# Patient Record
(demographics unavailable — no encounter records)

---

## 2024-10-15 NOTE — PHYSICAL EXAM
[Chaperone Present] : A chaperone was present in the examining room during all aspects of the physical examination [38650] : A chaperone was present during the pelvic exam. [Fully active, able to carry on all pre-disease performance without restriction] : Status 0 - Fully active, able to carry on all pre-disease performance without restriction

## 2024-10-15 NOTE — PHYSICAL EXAM
[Chaperone Present] : A chaperone was present in the examining room during all aspects of the physical examination [[id number]] : A chaperone was present during the pelvic exam. [Fully active, able to carry on all pre-disease performance without restriction] : Status 0 - Fully active, able to carry on all pre-disease performance without restriction

## 2024-10-18 NOTE — DISCUSSION/SUMMARY
[Reviewed Clinical Lab Test(s)] : Results of clinical tests were reviewed. [Reviewed Radiology Report(s)] : Radiology reports were reviewed. [Reviewed Radiology Film/Image(s)] : Images from radiology studies were reviewed and examined. [Discuss Alternatives/Risks/Benefits w/Patient] : All alternatives, risks, and benefits were discussed with the patient/family and all questions were answered.  Patient expressed good understanding and appreciates the importance of follow up as recommended. [Visit Time ___ Minutes] : [unfilled] minutes [Face to Face Time___ Minutes] : with [unfilled] minutes in face to face consultation. [FreeTextEntry1] : 52yo P2 w/ AUB- CAH/EIN, for definitive surgical  mgmt. Also with RASHEEDA / urge incontinence, for further evaluation. -more than 50% of visit spent face to face with patient reviewing records and interpreting imaging/path/lab results, counseling and coordinating care -I reviewed the diagnosis of CAH and natural history of this disease. I reviewed that it is a pre-cancer with 25% risk for progression to cancer if left untreated and with 40% risk of low grade endometrial cancer being found at time of final path on hysterectomy specimen. I reviewed medical vs surgical mgmt of this condition and that it is hormone driven / estrogen dependent. I explained that medical mgmt with progesterone therapy to counteract the excess estrogen is often reserved for patients who desire future fertility or are poor surgical candidates. Patient desires definitive surgery.  -Different surgical approaches discussed including minimally invasive and open approaches. I recommend advanced laparoscopic robotic assisted total hysterectomy and bilateral salpingo-oophorectomy with sentinel lymph node dissection. I explained to patient that since there is a 40% chance of finding concurrent endometrial cancer at time of final pathology, I follow The NCCN guidelines with regards to endometrial cancer staging and reviewed sentinel lymph node mapping and biopsies. If sentinel lymph node biopsy is unsuccessful, I explained that I would have pathologist perform a frozen section to determine if cancer is present. If cancer is present then full lymph node dissection would be performed. If no cancer found then I would not proceed with full lymph node dissection given the morbidity associated. all questions answered and patient expressed understanding. -pt with RASHEEDA/ urge incontinence/ overflow incontinence, will refer to pelvic floor PT and urogyn -pt to have her scheduled EGD/ colonoscopy 10/28/24 for GI sxs -Surgical booking: robo tlh/bso/slnbx/possible laparotomy -ERAS, pre-op clearance at [facility], labs, covid testing as pre protocol -the above diagnosis, nature of treatment, procedure, options, benefits, and risks were reviewed. I explained in detail the possible complications including but not limited to bleeding, transfusion, transfusion complications, infection, injury to internal organs such as injury to gastrointestinal or urinary tract, possible fistula formation, prolonged catherization, intestinal or urinary tract obstruction, vascular injury, wound complications, venous thrombosis, pulmonary embolus, sepsis, pain, chronic disability, and fatal complications, possible re-operation to correct complications, and possible abandoning or modifying the procedure due to inoperative findings was discussed. All questions were answered. Patient verbally indicated that she understood the nature of treatment, indications, options, benefits, and risks. Patient elected and consented to the procedure. Pt was informed that there was no guarantee for outcome or cure, and that additional therapy may be indicated depending on the operative findings.  -f/u post-op.

## 2024-10-18 NOTE — DISCUSSION/SUMMARY
[Reviewed Clinical Lab Test(s)] : Results of clinical tests were reviewed. [Reviewed Radiology Report(s)] : Radiology reports were reviewed. [Reviewed Radiology Film/Image(s)] : Images from radiology studies were reviewed and examined. [Discuss Alternatives/Risks/Benefits w/Patient] : All alternatives, risks, and benefits were discussed with the patient/family and all questions were answered.  Patient expressed good understanding and appreciates the importance of follow up as recommended. [Visit Time ___ Minutes] : [unfilled] minutes [Face to Face Time___ Minutes] : with [unfilled] minutes in face to face consultation. [FreeTextEntry1] : 52yo P2 w/ AUB- CAH/EIN, for definitive surgical  mgmt. Also with RASHEEDA / urge incontinence, for further evaluation. -more than 50% of visit spent face to face with patient reviewing records and interpreting imaging/path/lab results, counseling and coordinating care -I reviewed the diagnosis of CAH and natural history of this disease. I reviewed that it is a pre-cancer with 25% risk for progression to cancer if left untreated and with 40% risk of low grade endometrial cancer being found at time of final path on hysterectomy specimen. I reviewed medical vs surgical mgmt of this condition and that it is hormone driven / estrogen dependent. I explained that medical mgmt with progesterone therapy to counteract the excess estrogen is often reserved for patients who desire future fertility or are poor surgical candidates. Patient desires definitive surgery.  -Different surgical approaches discussed including minimally invasive and open approaches. I recommend advanced laparoscopic robotic assisted total hysterectomy and bilateral salpingo-oophorectomy with sentinel lymph node dissection. I explained to patient that since there is a 40% chance of finding concurrent endometrial cancer at time of final pathology, I follow The NCCN guidelines with regards to endometrial cancer staging and reviewed sentinel lymph node mapping and biopsies. If sentinel lymph node biopsy is unsuccessful, I explained that I would have pathologist perform a frozen section to determine if cancer is present. If cancer is present then full lymph node dissection would be performed. If no cancer found then I would not proceed with full lymph node dissection given the morbidity associated. all questions answered and patient expressed understanding. -pt with RASHEEDA/ urge incontinence/ overflow incontinence, will refer to pelvic floor PT and urogyn -pt to have her scheduled EGD/ colonoscopy 10/28/24 for GI sxs -Surgical booking: robo tlh/bso/slnbx/possible laparotomy -ERAS, pre-op clearance at [facility], labs, covid testing as pre protocol -the above diagnosis, nature of treatment, procedure, options, benefits, and risks were reviewed. I explained in detail the possible complications including but not limited to bleeding, transfusion, transfusion complications, infection, injury to internal organs such as injury to gastrointestinal or urinary tract, possible fistula formation, prolonged catherization, intestinal or urinary tract obstruction, vascular injury, wound complications, venous thrombosis, pulmonary embolus, sepsis, pain, chronic disability, and fatal complications, possible re-operation to correct complications, and possible abandoning or modifying the procedure due to inoperative findings was discussed. All questions were answered. Patient verbally indicated that she understood the nature of treatment, indications, options, benefits, and risks. Patient elected and consented to the procedure. Pt was informed that there was no guarantee for outcome or cure, and that additional therapy may be indicated depending on the operative findings.  -f/u post-op.

## 2024-10-18 NOTE — HISTORY OF PRESENT ILLNESS
[FreeTextEntry1] : 54yo P2  LMP  referred for AUB and finding of thickened EE. Pt was taken for hysteroscopy D&C and found to have atypical hyperplasia(see path below). She reports irregular and heavy bleeding over the last 2yrs causing anemia. She has had multiple D&C's which showed simple hyperplasia until most recent procedure and has been treated with Provera to thin lining in the past. She notes she has had worsening mixed urinary incontinence over the last two years and some increased bloating/gas. denies n/v/fever/bleeding/bloating. Reports normal urination and BMs. She is currently on iron supplementation and has intermittent constipation.   PMHx: anxiety PSHx: breast, nose surgery OBGYNHx:  x2, denies hx of fibroids/abn paps/stis, hx of ovarian cyst in the past FamHx: maternal grandmother with breast cancer, maternal grandfather lung cancer - smoker, uncle colon ca, uncle lung cancer - nonsmoker, denies gyn ca SocHx: social ETOH, denies smoking/illicit drugs All: Sulfa - rash   mammogram: up to date and wnl colonoscopy: done 3yrs ago and WNL, scheduled for 10/28/24 for EGD/colonoscopy due to reflux and bloating   Labs: 24 Pathology: A.  Endometrial polyp and endometrial curettings: Atypical hyperplasia with tubal metaplasia involving polypoid and nonpolypoid endometrial fragments in a background of proliferative endometrium.  Unremarkable squamous epithelium.  Unremarkable endocervical tissue, see comment. Comment: Immunohistochemistry shows the atypical glands have loss of PAX2 and PTEN, supporting the above diagnosis.  E-cadherin shows normal membranous staining in ARID1A is retained.  22: Pap: NILM, HPV neg   Imaging: MRI pelvis 2024: Findings: The uterus is anteverted and measures 8.5 x 5.8 x 6.6 cm.  The endometrium is mildly heterogeneous in signal, no discrete endometrial mass is evident.  Endometrium measures up to 1.1 cm in the uterine body.  Nabothian cysts are noted in the cervix. The junctional zone is not thickened.  The junctional zone anteriorly measures 0.8 cm and posteriorly 0.9 cm. An intramural leiomyoma in the right aspect of the uterine body measures 0.6 cm.  No additionally leiomyoma. A simple cyst or follicle in the right ovary measures 2.7 x 2.0 cm.  The right ovary measures 3.7 x 2.6 cm. A simple cyst or follicle in the left ovary measures 2.0 x 1.5 cm.  The left ovary measures 2.6 x 2.2 cm. A small amount of intrapelvic free fluid is likely physiologic. Imaged portions of the small bowel and colon are normal in course and caliber without focal wall thickening.  The urinary bladder is incompletely distended.  No bone marrow signal abnormality.   Impression: 1.  Mildly heterogeneous endometrium. 2.  No evidence of discrete endometrial mass, adenomyosis, or submucosal leiomyoma. 3.  Simple bilateral ovarian follicle/cyst, almost certainly benign   Pelvic sonogram 2024: Findings: Uterus: The uterus is anteverted, measuring 8.7 x 5.1 x 5.0 cm.  No discrete fibroids. Endometrium: Mildly prominent, 0.8 cm maximum thickness Right ovary: Measures 2.3 x 1.4 x 1.9 cm.  Right ovary has normal morphology Left ovary: Measures 1.2 x 2.1 x 1.5 cm.  No adnexal mass Free fluid: None Impression: 1.  No adnexal mass or free fluid. 2.  Mild prominence of the endometrium which may be related to the phase of the patient's cycle.  Consider follow-up 1-3 days after cessation of scheduled menses.

## 2024-12-24 NOTE — HISTORY OF PRESENT ILLNESS
[FreeTextEntry1] : 54yo P2 w/ AUB- CAH/EIN, and with RASHEEDA / urge incontinence. Pt s/p robo tlh/bso/slnbx, robotic uterosacral ligament suspension on 12/12/24. Surgical course c/b anaphylactic reaction to fluorocine administered IV during urogyn portion of case. Pt made full recovery after ICU admission.  Since procedure pt reports doing well. she had some leg pain and had RLE doppler to rule out DVT and it was negative. she denies pain/fever/bleeding/bloating. She has normal activity tolerance and normal appetite. Reports normal urination and BMs. She has been seen by urogyn for post-op visit and will continue f/u to monitor urinary sxs. She does admit to feeling very weak since admission but getting stronger everyday. she reports worsening menopause sxs with insomnia and night sweats/hot flushes. pt interested in HRT. Reviewed path and all benign. no further intervention.    Path 12/12/24: FINAL PATHOLOGIC DIAGNOSIS  A. ANTERIOR ABDOMINAL WALL IMPLANT, EXCISION - Fibroadipose tissue without significant pathologic abnormality - A stain for cytokeratin AE1/3 supports this diagnosis - Deeper levels were examined  B.  LEFT PELVIC SENTINEL LYMPH NODE, EXCISION - One histologically unremarkable lymph node - A stain for cytokeratin AE1/3 supports this diagnosis  C.  RIGHT PELVIC SENTINEL LYMPH NODE, EXCISION - One histologically unremarkable lymph node - A stain for cytokeratin AE1/3 supports this diagnosis  D.  UTERUS, CERVIX, BILATERAL FALLOPIAN TUBES AND BILATERAL OVARIES, HYSTERECTOMY AND BILATERAL SALPINGO-OOPHORECTOMY - Ectocervix with no significant pathologic abnormality - Endocervix with no significant pathologic abnormality - Uterus with benign proliferative endometrium with focal glandular crowding and focal metaplastic epithelial changes - Leiomyomata - Bilateral fimbriated fallopian tubes with no significant pathologic abnormalities - Bilateral ovaries with no significant pathologic abnormalities - Stains for p16, p53, cytokeratin 7, pax8, and CDX2, cytokeratin AE1/3, and Ki67 support these diagnoses - Deeper levels of select blocks were examined  Ed Jamison: 9/12/24 Pathology: A.  Endometrial polyp and endometrial curettings: Atypical hyperplasia with tubal metaplasia involving polypoid and nonpolypoid endometrial fragments in a background of proliferative endometrium.  Unremarkable squamous epithelium.  Unremarkable endocervical tissue, see comment. Comment: Immunohistochemistry shows the atypical glands have loss of PAX2 and PTEN, supporting the above diagnosis.  E-cadherin shows normal membranous staining in ARID1A is retained.  12/20/22: Pap: NILM, HPV neg   Imaging: MRI pelvis 7/25/2024: Findings: The uterus is anteverted and measures 8.5 x 5.8 x 6.6 cm.  The endometrium is mildly heterogeneous in signal, no discrete endometrial mass is evident.  Endometrium measures up to 1.1 cm in the uterine body.  Nabothian cysts are noted in the cervix. The junctional zone is not thickened.  The junctional zone anteriorly measures 0.8 cm and posteriorly 0.9 cm. An intramural leiomyoma in the right aspect of the uterine body measures 0.6 cm.  No additionally leiomyoma. A simple cyst or follicle in the right ovary measures 2.7 x 2.0 cm.  The right ovary measures 3.7 x 2.6 cm. A simple cyst or follicle in the left ovary measures 2.0 x 1.5 cm.  The left ovary measures 2.6 x 2.2 cm. A small amount of intrapelvic free fluid is likely physiologic. Imaged portions of the small bowel and colon are normal in course and caliber without focal wall thickening.  The urinary bladder is incompletely distended.  No bone marrow signal abnormality.   Impression: 1.  Mildly heterogeneous endometrium. 2.  No evidence of discrete endometrial mass, adenomyosis, or submucosal leiomyoma. 3.  Simple bilateral ovarian follicle/cyst, almost certainly benign   Pelvic sonogram 5/21/2024: Findings: Uterus: The uterus is anteverted, measuring 8.7 x 5.1 x 5.0 cm.  No discrete fibroids. Endometrium: Mildly prominent, 0.8 cm maximum thickness Right ovary: Measures 2.3 x 1.4 x 1.9 cm.  Right ovary has normal morphology Left ovary: Measures 1.2 x 2.1 x 1.5 cm.  No adnexal mass Free fluid: None Impression: 1.  No adnexal mass or free fluid. 2.  Mild prominence of the endometrium which may be related to the phase of the patient's cycle.  Consider follow-up 1-3 days after cessation of scheduled menses.

## 2024-12-24 NOTE — DISCUSSION/SUMMARY
[Reviewed Clinical Lab Test(s)] : Results of clinical tests were reviewed. [Reviewed Radiology Report(s)] : Radiology reports were reviewed. [Discuss Alternatives/Risks/Benefits w/Patient] : All alternatives, risks, and benefits were discussed with the patient/family and all questions were answered.  Patient expressed good understanding and appreciates the importance of follow up as recommended. [Visit Time ___ Minutes] : [unfilled] minutes [Face to Face Time___ Minutes] : with [unfilled] minutes in face to face consultation. [FreeTextEntry1] : 54yo P2 w/ AUB- CAH/EIN, and with RASHEEDA / urge incontinence. Pt s/p robo tlh/bso/slnbx, robotic uterosacral ligament suspension on 12/12/24. Surgical course c/b anaphylactic reaction to fluorocine administered IV during urogyn portion of case. Pt made full recovery after ICU admission. Exam today wnl, healing well. Path benign, no further intervention. Pt with surgical menopause sxs, for HRT with estrogen patch. -more than 50% of visit spent face to face with patient counseling and coordinating care -I reviewed final path and benign findings. reassured pt that CAH/EIN was confined to polyp, no residual disease on final hyst specimen. no furhter intervention needed -reviewed menopause sxs and will start estrogen patch and pt to transition care to GYN for further menopause mgmt -continue light activity, no heavy lifting, continue pelvic rest -rtc 4wks for cuff check -f/u with urogyn as scheduled -pain/fever/bleeding precautions given

## 2025-01-24 NOTE — HISTORY OF PRESENT ILLNESS
[FreeTextEntry1] : 53yo P2 w/ AUB- CAH/EIN, and with RASHEEDA / urge incontinence. Pt s/p robo tlh/bso/slnbx, robotic uterosacral ligament suspension on 12/12/24. Final path benign. Surgical course c/b anaphylactic reaction to fluorocine administered IV during urogyn portion of case. Pt made full recovery after ICU admission. Here today for cuff check  Since last visit patient reports doing very well. she is finally feeling like herself. she started estrogen patch for HRT and reports improved menopausal sxs. she denies pain/fever/bleeding/bloating. She has normal activity tolerance and normal appetite. Reports normal urination and BMs. she also is s/p abx for port site cellulitis which has now resolved.    Path 12/12/24: FINAL PATHOLOGIC DIAGNOSIS A. ANTERIOR ABDOMINAL WALL IMPLANT, EXCISION - Fibroadipose tissue without significant pathologic abnormality - A stain for cytokeratin AE1/3 supports this diagnosis - Deeper levels were examined B.  LEFT PELVIC SENTINEL LYMPH NODE, EXCISION - One histologically unremarkable lymph node - A stain for cytokeratin AE1/3 supports this diagnosis C.  RIGHT PELVIC SENTINEL LYMPH NODE, EXCISION - One histologically unremarkable lymph node - A stain for cytokeratin AE1/3 supports this diagnosis D.  UTERUS, CERVIX, BILATERAL FALLOPIAN TUBES AND BILATERAL OVARIES, HYSTERECTOMY AND BILATERAL SALPINGO-OOPHORECTOMY - Ectocervix with no significant pathologic abnormality - Endocervix with no significant pathologic abnormality - Uterus with benign proliferative endometrium with focal glandular crowding and focal metaplastic epithelial changes - Leiomyomata - Bilateral fimbriated fallopian tubes with no significant pathologic abnormalities - Bilateral ovaries with no significant pathologic abnormalities - Stains for p16, p53, cytokeratin 7, pax8, and CDX2, cytokeratin AE1/3, and Ki67 support these diagnoses - Deeper levels of select blocks were examined [facility]24 Pathology: A.  Endometrial polyp and endometrial curettings: Atypical hyperplasia with tubal metaplasia involving polypoid and nonpolypoid endometrial fragments in a background of proliferative endometrium.  Unremarkable squamous epithelium.  Unremarkable endocervical tissue, see comment. Comment: Immunohistochemistry shows the atypical glands have loss of PAX2 and PTEN, supporting the above diagnosis.  E-cadherin shows normal membranous staining in ARID1A is retained.  12/20/22: Pap: NILM, HPV neg   Imaging: MRI pelvis 7/25/2024: Findings: The uterus is anteverted and measures 8.5 x 5.8 x 6.6 cm.  The endometrium is mildly heterogeneous in signal, no discrete endometrial mass is evident.  Endometrium measures up to 1.1 cm in the uterine body.  Nabothian cysts are noted in the cervix. The junctional zone is not thickened.  The junctional zone anteriorly measures 0.8 cm and posteriorly 0.9 cm. An intramural leiomyoma in the right aspect of the uterine body measures 0.6 cm.  No additionally leiomyoma. A simple cyst or follicle in the right ovary measures 2.7 x 2.0 cm.  The right ovary measures 3.7 x 2.6 cm. A simple cyst or follicle in the left ovary measures 2.0 x 1.5 cm.  The left ovary measures 2.6 x 2.2 cm. A small amount of intrapelvic free fluid is likely physiologic. Imaged portions of the small bowel and colon are normal in course and caliber without focal wall thickening.  The urinary bladder is incompletely distended.  No bone marrow signal abnormality.   Impression: 1.  Mildly heterogeneous endometrium. 2.  No evidence of discrete endometrial mass, adenomyosis, or submucosal leiomyoma. 3.  Simple bilateral ovarian follicle/cyst, almost certainly benign   Pelvic sonogram 5/21/2024: Findings: Uterus: The uterus is anteverted, measuring 8.7 x 5.1 x 5.0 cm.  No discrete fibroids. Endometrium: Mildly prominent, 0.8 cm maximum thickness Right ovary: Measures 2.3 x 1.4 x 1.9 cm.  Right ovary has normal morphology Left ovary: Measures 1.2 x 2.1 x 1.5 cm.  No adnexal mass Free fluid: None Impression: 1.  No adnexal mass or free fluid. 2.  Mild prominence of the endometrium which may be related to the phase of the patient's cycle.  Consider follow-up 1-3 days after cessation of scheduled menses.

## 2025-01-24 NOTE — PHYSICAL EXAM
[Fully active, able to carry on all pre-disease performance without restriction] : Status 0 - Fully active, able to carry on all pre-disease performance without restriction [Chaperone Present] : A chaperone was present in the examining room during all aspects of the physical examination [70752] : A chaperone was present during the pelvic exam.

## 2025-01-24 NOTE — HISTORY OF PRESENT ILLNESS
[FreeTextEntry1] : 55yo P2 w/ AUB- CAH/EIN, and with RASHEEDA / urge incontinence. Pt s/p robo tlh/bso/slnbx, robotic uterosacral ligament suspension on 12/12/24. Final path benign. Surgical course c/b anaphylactic reaction to fluorocine administered IV during urogyn portion of case. Pt made full recovery after ICU admission. Here today for cuff check  Since last visit patient reports doing very well. she is finally feeling like herself. she started estrogen patch for HRT and reports improved menopausal sxs. she denies pain/fever/bleeding/bloating. She has normal activity tolerance and normal appetite. Reports normal urination and BMs. she also is s/p abx for port site cellulitis which has now resolved.    Path 12/12/24: FINAL PATHOLOGIC DIAGNOSIS A. ANTERIOR ABDOMINAL WALL IMPLANT, EXCISION - Fibroadipose tissue without significant pathologic abnormality - A stain for cytokeratin AE1/3 supports this diagnosis - Deeper levels were examined B.  LEFT PELVIC SENTINEL LYMPH NODE, EXCISION - One histologically unremarkable lymph node - A stain for cytokeratin AE1/3 supports this diagnosis C.  RIGHT PELVIC SENTINEL LYMPH NODE, EXCISION - One histologically unremarkable lymph node - A stain for cytokeratin AE1/3 supports this diagnosis D.  UTERUS, CERVIX, BILATERAL FALLOPIAN TUBES AND BILATERAL OVARIES, HYSTERECTOMY AND BILATERAL SALPINGO-OOPHORECTOMY - Ectocervix with no significant pathologic abnormality - Endocervix with no significant pathologic abnormality - Uterus with benign proliferative endometrium with focal glandular crowding and focal metaplastic epithelial changes - Leiomyomata - Bilateral fimbriated fallopian tubes with no significant pathologic abnormalities - Bilateral ovaries with no significant pathologic abnormalities - Stains for p16, p53, cytokeratin 7, pax8, and CDX2, cytokeratin AE1/3, and Ki67 support these diagnoses - Deeper levels of select blocks were examined [facility] Pathology: A.  Endometrial polyp and endometrial curettings: Atypical hyperplasia with tubal metaplasia involving polypoid and nonpolypoid endometrial fragments in a background of proliferative endometrium.  Unremarkable squamous epithelium.  Unremarkable endocervical tissue, see comment. Comment: Immunohistochemistry shows the atypical glands have loss of PAX2 and PTEN, supporting the above diagnosis.  E-cadherin shows normal membranous staining in ARID1A is retained.  12/20/22: Pap: NILM, HPV neg   Imaging: MRI pelvis 7/25/2024: Findings: The uterus is anteverted and measures 8.5 x 5.8 x 6.6 cm.  The endometrium is mildly heterogeneous in signal, no discrete endometrial mass is evident.  Endometrium measures up to 1.1 cm in the uterine body.  Nabothian cysts are noted in the cervix. The junctional zone is not thickened.  The junctional zone anteriorly measures 0.8 cm and posteriorly 0.9 cm. An intramural leiomyoma in the right aspect of the uterine body measures 0.6 cm.  No additionally leiomyoma. A simple cyst or follicle in the right ovary measures 2.7 x 2.0 cm.  The right ovary measures 3.7 x 2.6 cm. A simple cyst or follicle in the left ovary measures 2.0 x 1.5 cm.  The left ovary measures 2.6 x 2.2 cm. A small amount of intrapelvic free fluid is likely physiologic. Imaged portions of the small bowel and colon are normal in course and caliber without focal wall thickening.  The urinary bladder is incompletely distended.  No bone marrow signal abnormality.   Impression: 1.  Mildly heterogeneous endometrium. 2.  No evidence of discrete endometrial mass, adenomyosis, or submucosal leiomyoma. 3.  Simple bilateral ovarian follicle/cyst, almost certainly benign   Pelvic sonogram 5/21/2024: Findings: Uterus: The uterus is anteverted, measuring 8.7 x 5.1 x 5.0 cm.  No discrete fibroids. Endometrium: Mildly prominent, 0.8 cm maximum thickness Right ovary: Measures 2.3 x 1.4 x 1.9 cm.  Right ovary has normal morphology Left ovary: Measures 1.2 x 2.1 x 1.5 cm.  No adnexal mass Free fluid: None Impression: 1.  No adnexal mass or free fluid. 2.  Mild prominence of the endometrium which may be related to the phase of the patient's cycle.  Consider follow-up 1-3 days after cessation of scheduled menses.

## 2025-01-24 NOTE — PHYSICAL EXAM
[Fully active, able to carry on all pre-disease performance without restriction] : Status 0 - Fully active, able to carry on all pre-disease performance without restriction [Chaperone Present] : A chaperone was present in the examining room during all aspects of the physical examination [61643] : A chaperone was present during the pelvic exam.

## 2025-01-24 NOTE — DISCUSSION/SUMMARY
[Reviewed Clinical Lab Test(s)] : Results of clinical tests were reviewed. [Reviewed Radiology Report(s)] : Radiology reports were reviewed. [Discuss Alternatives/Risks/Benefits w/Patient] : All alternatives, risks, and benefits were discussed with the patient/family and all questions were answered.  Patient expressed good understanding and appreciates the importance of follow up as recommended. [Visit Time ___ Minutes] : [unfilled] minutes [Face to Face Time___ Minutes] : with [unfilled] minutes in face to face consultation. [FreeTextEntry1] : 55yo P2 w/ AUB- CAH/EIN, and with RASHEEDA / urge incontinence. Pt s/p robo tlh/bso/slnbx, robotic uterosacral ligament suspension on 12/12/24. Surgical course c/b anaphylactic reaction to fluorocine administered IV during urogyn portion of case. Pt made full recovery after ICU admission. Final path benign. Pt recovering well.  -more than 50% of visit spent face to face with patient counseling and coordinating care -I reviewed final path and benign findings. reassured pt that CAH/EIN was confined to polyp, no residual disease on final hyst specimen. no further intervention needed -pt to continue estrogen patch for HRT. will transition to general GYN for continued care -vaginal cuff well healed with solitary residual suture. pt advised slow return to full activity over next 2wks then she can resume all activities without limitations. refrain from intercourse for next 2wks -f/u with urogyn as scheduled -rtc 3 months and then likely d/c to general GYN -pain/fever/bleeding precautions given

## 2025-02-10 NOTE — REVIEW OF SYSTEMS
Exam and history consistent with viral gastroenteritis.  -Increase fluid intake to maintain hydration and to help fight infection.  -Alternate ibuprofen and tylenol every 3 hours as needed for fever/pain  -1 tablespoon of honey or mixed with hot tea for help with cough.  Recommend taking 30 minutes before sleep to help with cough at night  -Steam inhalation, warm compresses, humidifier, and warm soup can also help  -If he becomes unable to keep down any fluids, please call 911 and/or head to the emergency room immediately  -Please follow-up with your PCP in the next 2 to 4 weeks    If symptoms persist or worsen, please contact PCP and/or return to urgent care.    
[Negative] : Musculoskeletal

## 2025-05-27 NOTE — PHYSICAL EXAM
[RN] : RN [Fully active, able to carry on all pre-disease performance without restriction] : Status 0 - Fully active, able to carry on all pre-disease performance without restriction [FreeTextEntry2] : Iza Villa

## 2025-05-27 NOTE — HISTORY OF PRESENT ILLNESS
[FreeTextEntry1] : 55yo P2 w/ AUB- CAH/EIN, and with RASHEEDA / urge incontinence. Pt s/p robo tlh/bso/slnbx, robotic uterosacral ligament suspension on 12/12/24. Final path benign. Surgical course c/b anaphylactic reaction to fluorocine administered IV during urogyn portion of case. Pt made full recovery after ICU admission. Here today for pelvic discomfort following intercourse on Friday. Pt states she had pain immediately following which continued through the weekend and now with pelvic pressure. She palpated her own vaginal cuff and believes she feels a separation. She also notes that urinary symptoms have worsening in the last few weeks with spontaneous leaking. She continues on estrogen patch but just increased dose in the last few weeks, discussed waiting at least 4-6wks to determine if increased dose is effective. reports improved menopausal sxs but still experiencing occasional hot flashes. she denies fever/bleeding/bloating. She has normal activity tolerance and normal appetite. Reports normal baseline urination and BMs.     Path 12/12/24: FINAL PATHOLOGIC DIAGNOSIS A. ANTERIOR ABDOMINAL WALL IMPLANT, EXCISION - Fibroadipose tissue without significant pathologic abnormality - A stain for cytokeratin AE1/3 supports this diagnosis - Deeper levels were examined B.  LEFT PELVIC SENTINEL LYMPH NODE, EXCISION - One histologically unremarkable lymph node - A stain for cytokeratin AE1/3 supports this diagnosis C.  RIGHT PELVIC SENTINEL LYMPH NODE, EXCISION - One histologically unremarkable lymph node - A stain for cytokeratin AE1/3 supports this diagnosis D.  UTERUS, CERVIX, BILATERAL FALLOPIAN TUBES AND BILATERAL OVARIES, HYSTERECTOMY AND BILATERAL SALPINGO-OOPHORECTOMY - Ectocervix with no significant pathologic abnormality - Endocervix with no significant pathologic abnormality - Uterus with benign proliferative endometrium with focal glandular crowding and focal metaplastic epithelial changes - Leiomyomata - Bilateral fimbriated fallopian tubes with no significant pathologic abnormalities - Bilateral ovaries with no significant pathologic abnormalities - Stains for p16, p53, cytokeratin 7, pax8, and CDX2, cytokeratin AE1/3, and Ki67 support these diagnoses - Deeper levels of select blocks were examined [facility] Pathology: A.  Endometrial polyp and endometrial curettings: Atypical hyperplasia with tubal metaplasia involving polypoid and nonpolypoid endometrial fragments in a background of proliferative endometrium.  Unremarkable squamous epithelium.  Unremarkable endocervical tissue, see comment. Comment: Immunohistochemistry shows the atypical glands have loss of PAX2 and PTEN, supporting the above diagnosis.  E-cadherin shows normal membranous staining in ARID1A is retained.  12/20/22: Pap: NILM, HPV neg   Imaging: MRI pelvis 7/25/2024: Findings: The uterus is anteverted and measures 8.5 x 5.8 x 6.6 cm.  The endometrium is mildly heterogeneous in signal, no discrete endometrial mass is evident.  Endometrium measures up to 1.1 cm in the uterine body.  Nabothian cysts are noted in the cervix. The junctional zone is not thickened.  The junctional zone anteriorly measures 0.8 cm and posteriorly 0.9 cm. An intramural leiomyoma in the right aspect of the uterine body measures 0.6 cm.  No additionally leiomyoma. A simple cyst or follicle in the right ovary measures 2.7 x 2.0 cm.  The right ovary measures 3.7 x 2.6 cm. A simple cyst or follicle in the left ovary measures 2.0 x 1.5 cm.  The left ovary measures 2.6 x 2.2 cm. A small amount of intrapelvic free fluid is likely physiologic. Imaged portions of the small bowel and colon are normal in course and caliber without focal wall thickening.  The urinary bladder is incompletely distended.  No bone marrow signal abnormality.   Impression: 1.  Mildly heterogeneous endometrium. 2.  No evidence of discrete endometrial mass, adenomyosis, or submucosal leiomyoma. 3.  Simple bilateral ovarian follicle/cyst, almost certainly benign   Pelvic sonogram 5/21/2024: Findings: Uterus: The uterus is anteverted, measuring 8.7 x 5.1 x 5.0 cm.  No discrete fibroids. Endometrium: Mildly prominent, 0.8 cm maximum thickness Right ovary: Measures 2.3 x 1.4 x 1.9 cm.  Right ovary has normal morphology Left ovary: Measures 1.2 x 2.1 x 1.5 cm.  No adnexal mass Free fluid: None Impression: 1.  No adnexal mass or free fluid. 2.  Mild prominence of the endometrium which may be related to the phase of the patient's cycle.  Consider follow-up 1-3 days after cessation of scheduled menses.

## 2025-05-27 NOTE — DISCUSSION/SUMMARY
[Reviewed Clinical Lab Test(s)] : Results of clinical tests were reviewed. [Reviewed Radiology Report(s)] : Radiology reports were reviewed. [Discuss Alternatives/Risks/Benefits w/Patient] : All alternatives, risks, and benefits were discussed with the patient/family and all questions were answered.  Patient expressed good understanding and appreciates the importance of follow up as recommended. [Visit Time ___ Minutes] : [unfilled] minutes [Face to Face Time___ Minutes] : with [unfilled] minutes in face to face consultation. [FreeTextEntry1] : 53yo P2 w/ AUB- CAH/EIN, and with RASHEEDA / urge incontinence. Pt s/p robo tlh/bso/slnbx, robotic uterosacral ligament suspension on 12/12/24. Surgical course c/b anaphylactic reaction to fluorocine administered IV during urogyn portion of case. Pt made full recovery after ICU admission. Final path benign. Pt now presents with pain following intercourse and palpable vaginal cuff defect, found to have vaginal cuff mucosal separation.  -more than 50% of visit spent face to face with patient counseling and coordinating care -I reviewed exam findings c/w mucosal separation and no evidence of fistula. Discussed with Dr Vazquez who agrees low suspicion for fistula. Pt has follow up with uro-gyn later this week and will have further evaluation of urinary symptoms -strict pelvic rest, instructed nothing in vagina other than estrogen cream -f/u UA to r/o UTI -pt to continue estrogen patch for HRT. Will send rx for vaginal estrogen as well to aid in healing of mucosa -will likely require vaginal cuff repair and will book for OR following visit with Dr Vazquez -pain/fever/bleeding precautions given  d/w Dr Grover

## 2025-05-29 NOTE — DISCUSSION/SUMMARY
[Reviewed Clinical Lab Test(s)] : Results of clinical tests were reviewed. [Discuss Alternatives/Risks/Benefits w/Patient] : All alternatives, risks, and benefits were discussed with the patient/family and all questions were answered.  Patient expressed good understanding and appreciates the importance of follow up as recommended. [Visit Time ___ Minutes] : [unfilled] minutes [Face to Face Time___ Minutes] : with [unfilled] minutes in face to face consultation. [FreeTextEntry1] : 53yo P2 w/ AUB- CAH/EIN, and with RASHEEDA / urge incontinence. Pt s/p robo tlh/bso/slnbx, robotic uterosacral ligament suspension on 12/12/24. Surgical course c/b anaphylactic reaction to fluorocine administered IV during urogyn portion of case. Pt made full recovery after ICU admission. Final path benign. Pt now with delayed vaginal cuff mucosa separation. For continued vaginal estrogen therapy and for surgical repair. -entire televisit spent counseling patient and coordinating care. -pt with new urinary sxs, likely UTI which is being treated. Will see Dr. Vazquez post-op to re-eval urinary sxs and determine if other urogyn mgmt needed -Surgical booking: eua/ vaginal cuff repair  -pt will need clearance/ optimization by [facility] medicine/anesthesia/critical care teams -risk, benefits reviewed with patient regarding above described procedure.  Reviewed risks of procedure not limited to infection, bleeding, injury to surrounding structures,VTE, heart and lung complications. all questions answered and patient expressed understanding --f/u post-op -pain/fever/bleeding precautions given

## 2025-05-29 NOTE — REASON FOR VISIT
[FreeTextEntry1] : This telephonic visit was provided via audio only technology, location does not have video capabilities. The patient, MELODIE WEISS, was located at work, at the time of the visit.  The provider, ALEJANDRO PEREZ was located at office at [place], [place], at the time of the visit. The patient, QUANGMOOSE ABHISHEK and Provider participated in the telephonic visit.   Verbal consent for telephonic services was given on 05/29/2025 by the patient, MELODIE WEISS.

## 2025-05-29 NOTE — HISTORY OF PRESENT ILLNESS
[FreeTextEntry1] : 55yo P2 w/ AUB- CAH/EIN, and with RASHEEDA / urge incontinence. Pt s/p robo tlh/bso/slnbx, robotic uterosacral ligament suspension on 12/12/24. Final path benign. Surgical course c/b anaphylactic reaction to fluorocine administered IV during urogyn portion of case. Pt made full recovery after ICU admission.  Televisit today to review plan for surgical repair of vaginal cuff mucosa separation. Pt was seen by ANTONI Cabrera and diagnosis was made. Pt then saw Dr. Vazquez for eval and to ensure no concern for fistula since pt had some worsening urinary sxs. UA/Ucx done and prelim UA shows  presumed UTI and pt started on abx. Pt also started on vaginal estrogen however recommendation is for cuff repair to expedite healing process.  Pt without new complaints. She started abx for UTI and has stopped vitamins in preparation for surgery on 6/2/25. Pt to expect call from [facility] for final clearance and testing. I reviewed procedure with pt and plan for cuff repair with permanent sutures which will be removed at a later date in the office. all questions answered and patient expressed understanding   Path 12/12/24: FINAL PATHOLOGIC DIAGNOSIS A. ANTERIOR ABDOMINAL WALL IMPLANT, EXCISION - Fibroadipose tissue without significant pathologic abnormality - A stain for cytokeratin AE1/3 supports this diagnosis - Deeper levels were examined B.  LEFT PELVIC SENTINEL LYMPH NODE, EXCISION - One histologically unremarkable lymph node - A stain for cytokeratin AE1/3 supports this diagnosis C.  RIGHT PELVIC SENTINEL LYMPH NODE, EXCISION - One histologically unremarkable lymph node - A stain for cytokeratin AE1/3 supports this diagnosis D.  UTERUS, CERVIX, BILATERAL FALLOPIAN TUBES AND BILATERAL OVARIES, HYSTERECTOMY AND BILATERAL SALPINGO-OOPHORECTOMY - Ectocervix with no significant pathologic abnormality - Endocervix with no significant pathologic abnormality - Uterus with benign proliferative endometrium with focal glandular crowding and focal metaplastic epithelial changes - Leiomyomata - Bilateral fimbriated fallopian tubes with no significant pathologic abnormalities - Bilateral ovaries with no significant pathologic abnormalities - Stains for p16, p53, cytokeratin 7, pax8, and CDX2, cytokeratin AE1/3, and Ki67 support these diagnoses - Deeper levels of select blocks were examined [facility]: [facility] Pathology: A.  Endometrial polyp and endometrial curettings: Atypical hyperplasia with tubal metaplasia involving polypoid and nonpolypoid endometrial fragments in a background of proliferative endometrium.  Unremarkable squamous epithelium.  Unremarkable endocervical tissue, see comment. Comment: Immunohistochemistry shows the atypical glands have loss of PAX2 and PTEN, supporting the above diagnosis.  E-cadherin shows normal membranous staining in ARID1A is retained.  12/20/22: Pap: NILM, HPV neg   Imaging: MRI pelvis 7/25/2024: Findings: The uterus is anteverted and measures 8.5 x 5.8 x 6.6 cm.  The endometrium is mildly heterogeneous in signal, no discrete endometrial mass is evident.  Endometrium measures up to 1.1 cm in the uterine body.  Nabothian cysts are noted in the cervix. The junctional zone is not thickened.  The junctional zone anteriorly measures 0.8 cm and posteriorly 0.9 cm. An intramural leiomyoma in the right aspect of the uterine body measures 0.6 cm.  No additionally leiomyoma. A simple cyst or follicle in the right ovary measures 2.7 x 2.0 cm.  The right ovary measures 3.7 x 2.6 cm. A simple cyst or follicle in the left ovary measures 2.0 x 1.5 cm.  The left ovary measures 2.6 x 2.2 cm. A small amount of intrapelvic free fluid is likely physiologic. Imaged portions of the small bowel and colon are normal in course and caliber without focal wall thickening.  The urinary bladder is incompletely distended.  No bone marrow signal abnormality.   Impression: 1.  Mildly heterogeneous endometrium. 2.  No evidence of discrete endometrial mass, adenomyosis, or submucosal leiomyoma. 3.  Simple bilateral ovarian follicle/cyst, almost certainly benign   Pelvic sonogram 5/21/2024: Findings: Uterus: The uterus is anteverted, measuring 8.7 x 5.1 x 5.0 cm.  No discrete fibroids. Endometrium: Mildly prominent, 0.8 cm maximum thickness Right ovary: Measures 2.3 x 1.4 x 1.9 cm.  Right ovary has normal morphology Left ovary: Measures 1.2 x 2.1 x 1.5 cm.  No adnexal mass Free fluid: None Impression: 1.  No adnexal mass or free fluid. 2.  Mild prominence of the endometrium which may be related to the phase of the patient's cycle.  Consider follow-up 1-3 days after cessation of scheduled menses.

## 2025-06-18 NOTE — DISCUSSION/SUMMARY
[Reviewed Clinical Lab Test(s)] : Results of clinical tests were reviewed. [Reviewed Radiology Report(s)] : Radiology reports were reviewed. [Discuss Alternatives/Risks/Benefits w/Patient] : All alternatives, risks, and benefits were discussed with the patient/family and all questions were answered.  Patient expressed good understanding and appreciates the importance of follow up as recommended. [Visit Time ___ Minutes] : [unfilled] minutes [Face to Face Time___ Minutes] : with [unfilled] minutes in face to face consultation. [FreeTextEntry1] : 53yo P2 w/ AUB- CAH/EIN, and with RASHEEDA / urge incontinence. Pt s/p robo tlh/bso/slnbx, robotic uterosacral ligament suspension on 12/12/24. Surgical course c/b anaphylactic reaction to fluorocine administered IV during urogyn portion of case. Pt made full recovery after ICU admission. Final path benign. Pt now s/p surgical mgmt of vaginal cuff dehiscence with adherent small bowel. Exam and sxs concerning for partial SBO. Pt to ED for further eval and tx planning. -more than 50% of visit spent face to face with patient counseling and coordinating care -I reviewed exam and sxs and my concern for partial SBO given condition of small bowel at time of surgery. Explained that she has likely unresolved inflammation that could be causing partial SBO sxs. Recommended ct scan po/iv contrast for further eval and rule out other pathology. Explained to pt hopefully she just needs conservative mgmt with bowel rest and no surgery. -will connect with Dr. Gill once ct scan results are in -to ED now -pain/fever/bleeding precautions given

## 2025-06-18 NOTE — HISTORY OF PRESENT ILLNESS
[FreeTextEntry1] : 53yo P2 w/ AUB- CAH/EIN, and with RASHEEDA / urge incontinence. Pt s/p robo tlh/bso/slnbx, robotic uterosacral ligament suspension on 12/12/24. Final path benign. Surgical course c/b anaphylactic reaction to fluorocine administered IV during urogyn portion of case. Pt made full recovery after ICU admission.  Since last visit pt is now s/p EUA/LSC lysis of adhesions, vaginal cuff repair 6/2/25 for new vaginal cuff dehiscence with adherent small bowel. Findings in the OR revealed vaginal cuff dehiscence with small bowel scared to cuff opening. Intra-op gen surg consult (Dr. Gill) called and evaluated small bowel and advised no need for bowel resection. Pt presents today for post-op check and c/o worsening abdominal pain/ gas and frequent "burping" since surgery. Pt not tolerating PO and having pain with solid food. has had BMs and continues to pass flatus. denies n/v/fever. reports normal urination. Pt has not has solid food since monday night. She has been on clears all day Tuesday with 6hr NPO Tuesday night and overnight into today. She has had clears only today and reports some mild improvement.  Path 12/12/24: FINAL PATHOLOGIC DIAGNOSIS A. ANTERIOR ABDOMINAL WALL IMPLANT, EXCISION - Fibroadipose tissue without significant pathologic abnormality - A stain for cytokeratin AE1/3 supports this diagnosis - Deeper levels were examined B.  LEFT PELVIC SENTINEL LYMPH NODE, EXCISION - One histologically unremarkable lymph node - A stain for cytokeratin AE1/3 supports this diagnosis C.  RIGHT PELVIC SENTINEL LYMPH NODE, EXCISION - One histologically unremarkable lymph node - A stain for cytokeratin AE1/3 supports this diagnosis D.  UTERUS, CERVIX, BILATERAL FALLOPIAN TUBES AND BILATERAL OVARIES, HYSTERECTOMY AND BILATERAL SALPINGO-OOPHORECTOMY - Ectocervix with no significant pathologic abnormality - Endocervix with no significant pathologic abnormality - Uterus with benign proliferative endometrium with focal glandular crowding and focal metaplastic epithelial changes - Leiomyomata - Bilateral fimbriated fallopian tubes with no significant pathologic abnormalities - Bilateral ovaries with no significant pathologic abnormalities - Stains for p16, p53, cytokeratin 7, pax8, and CDX2, cytokeratin AE1/3, and Ki67 support these diagnoses - Deeper levels of select blocks were examined Ed Hilario  Labs: 9/12/24 Pathology: A.  Endometrial polyp and endometrial curettings: Atypical hyperplasia with tubal metaplasia involving polypoid and nonpolypoid endometrial fragments in a background of proliferative endometrium.  Unremarkable squamous epithelium.  Unremarkable endocervical tissue, see comment. Comment: Immunohistochemistry shows the atypical glands have loss of PAX2 and PTEN, supporting the above diagnosis.  E-cadherin shows normal membranous staining in ARID1A is retained.  12/20/22: Pap: NILM, HPV neg   Imaging: MRI pelvis 7/25/2024: Findings: The uterus is anteverted and measures 8.5 x 5.8 x 6.6 cm.  The endometrium is mildly heterogeneous in signal, no discrete endometrial mass is evident.  Endometrium measures up to 1.1 cm in the uterine body.  Nabothian cysts are noted in the cervix. The junctional zone is not thickened.  The junctional zone anteriorly measures 0.8 cm and posteriorly 0.9 cm. An intramural leiomyoma in the right aspect of the uterine body measures 0.6 cm.  No additionally leiomyoma. A simple cyst or follicle in the right ovary measures 2.7 x 2.0 cm.  The right ovary measures 3.7 x 2.6 cm. A simple cyst or follicle in the left ovary measures 2.0 x 1.5 cm.  The left ovary measures 2.6 x 2.2 cm. A small amount of intrapelvic free fluid is likely physiologic. Imaged portions of the small bowel and colon are normal in course and caliber without focal wall thickening.  The urinary bladder is incompletely distended.  No bone marrow signal abnormality.   Impression: 1.  Mildly heterogeneous endometrium. 2.  No evidence of discrete endometrial mass, adenomyosis, or submucosal leiomyoma. 3.  Simple bilateral ovarian follicle/cyst, almost certainly benign   Pelvic sonogram 5/21/2024: Findings: Uterus: The uterus is anteverted, measuring 8.7 x 5.1 x 5.0 cm.  No discrete fibroids. Endometrium: Mildly prominent, 0.8 cm maximum thickness Right ovary: Measures 2.3 x 1.4 x 1.9 cm.  Right ovary has normal morphology Left ovary: Measures 1.2 x 2.1 x 1.5 cm.  No adnexal mass Free fluid: None Impression: 1.  No adnexal mass or free fluid. 2.  Mild prominence of the endometrium which may be related to the phase of the patient's cycle.  Consider follow-up 1-3 days after cessation of scheduled menses.

## 2025-06-18 NOTE — PHYSICAL EXAM
[Fully active, able to carry on all pre-disease performance without restriction] : Status 0 - Fully active, able to carry on all pre-disease performance without restriction [RN] : RN [FreeTextEntry2] : Iza

## 2025-07-25 NOTE — HISTORY OF PRESENT ILLNESS
[FreeTextEntry1] : 53yo P2 w/ AUB- CAH/EIN, and with RASHEEDA / urge incontinence. Pt s/p robo tlh/bso/slnbx, robotic uterosacral ligament suspension on 24. Final path benign. Surgical course c/b anaphylactic reaction to fluorocine administered IV during urogyn portion of case. Pt made full recovery after ICU admission.  Since last visit pt is now s/p EUA/LSC lysis of adhesions, vaginal cuff repair 25 for new vaginal cuff dehiscence with adherent small bowel. Findings in the OR revealed vaginal cuff dehiscence with small bowel scared to cuff opening. Intra-op gen surg consult (Dr. Gill) called and evaluated small bowel and advised no need for bowel resection.  Pt here today for rpt cuff check.  denies pain/fever/bleeding/bloating. She has normal activity tolerance and normal appetite. Reports normal urination and BMs. She continues pelvic rest. She completed recent abx for UTI, states sxs better but requests test of cure today. We reviewed ct scan done at last visit and no evidence of SBO and pt reports eating is back to normal and no more abdominal pain/bloating/nausea.  Path 24: FINAL PATHOLOGIC DIAGNOSIS A. ANTERIOR ABDOMINAL WALL IMPLANT, EXCISION - Fibroadipose tissue without significant pathologic abnormality - A stain for cytokeratin AE1/3 supports this diagnosis - Deeper levels were examined B.  LEFT PELVIC SENTINEL LYMPH NODE, EXCISION - One histologically unremarkable lymph node - A stain for cytokeratin AE1/3 supports this diagnosis C.  RIGHT PELVIC SENTINEL LYMPH NODE, EXCISION - One histologically unremarkable lymph node - A stain for cytokeratin AE1/3 supports this diagnosis D.  UTERUS, CERVIX, BILATERAL FALLOPIAN TUBES AND BILATERAL OVARIES, HYSTERECTOMY AND BILATERAL SALPINGO-OOPHORECTOMY - Ectocervix with no significant pathologic abnormality - Endocervix with no significant pathologic abnormality - Uterus with benign proliferative endometrium with focal glandular crowding and focal metaplastic epithelial changes - Leiomyomata - Bilateral fimbriated fallopian tubes with no significant pathologic abnormalities - Bilateral ovaries with no significant pathologic abnormalities - Stains for p16, p53, cytokeratin 7, pax8, and CDX2, cytokeratin AE1/3, and Ki67 support these diagnoses - Deeper levels of select blocks were examined [facility] Pathology: A.  Endometrial polyp and endometrial curettings: Atypical hyperplasia with tubal metaplasia involving polypoid and nonpolypoid endometrial fragments in a background of proliferative endometrium.  Unremarkable squamous epithelium.  Unremarkable endocervical tissue, see comment. Comment: Immunohistochemistry shows the atypical glands have loss of PAX2 and PTEN, supporting the above diagnosis.  E-cadherin shows normal membranous staining in ARID1A is retained.  22: Pap: NILM, HPV neg   Imagin25 CT: FINDINGS: LOWER CHEST: Bilateral breast implants. LIVER: Subcentimeter left hepatic hypodensity, too small to further characterize. BILE DUCTS: Normal caliber. GALLBLADDER: Within normal limits. SPLEEN: Within normal limits. PANCREAS: Within normal limits. ADRENALS: Bilateral thickening. KIDNEYS/URETERS: Within normal limits. BLADDER: Within normal limits. REPRODUCTIVE ORGANS: Hysterectomy. BOWEL: No bowel obstruction. Appendix is within normal limits. PERITONEUM/RETROPERITONEUM: Within normal limits. VESSELS: Within normal limits. LYMPH NODES: No lymphadenopathy.   ABDOMINAL WALL: Postsurgical changes. BONES: Mild degenerative changes. IMPRESSION:  Etiology of abdominal pain is not elucidated. --- End of Report ---  MRI pelvis 2024: Findings: The uterus is anteverted and measures 8.5 x 5.8 x 6.6 cm.  The endometrium is mildly heterogeneous in signal, no discrete endometrial mass is evident.  Endometrium measures up to 1.1 cm in the uterine body.  Nabothian cysts are noted in the cervix. The junctional zone is not thickened.  The junctional zone anteriorly measures 0.8 cm and posteriorly 0.9 cm. An intramural leiomyoma in the right aspect of the uterine body measures 0.6 cm.  No additionally leiomyoma. A simple cyst or follicle in the right ovary measures 2.7 x 2.0 cm.  The right ovary measures 3.7 x 2.6 cm. A simple cyst or follicle in the left ovary measures 2.0 x 1.5 cm.  The left ovary measures 2.6 x 2.2 cm. A small amount of intrapelvic free fluid is likely physiologic. Imaged portions of the small bowel and colon are normal in course and caliber without focal wall thickening.  The urinary bladder is incompletely distended.  No bone marrow signal abnormality.   Impression: 1.  Mildly heterogeneous endometrium. 2.  No evidence of discrete endometrial mass, adenomyosis, or submucosal leiomyoma. 3.  Simple bilateral ovarian follicle/cyst, almost certainly benign   Pelvic sonogram 2024: Findings: Uterus: The uterus is anteverted, measuring 8.7 x 5.1 x 5.0 cm.  No discrete fibroids. Endometrium: Mildly prominent, 0.8 cm maximum thickness Right ovary: Measures 2.3 x 1.4 x 1.9 cm.  Right ovary has normal morphology Left ovary: Measures 1.2 x 2.1 x 1.5 cm.  No adnexal mass Free fluid: None Impression: 1.  No adnexal mass or free fluid. 2.  Mild prominence of the endometrium which may be related to the phase of the patient's cycle.  Consider follow-up 1-3 days after cessation of scheduled menses.

## 2025-07-25 NOTE — PHYSICAL EXAM
[RN] : RN [Fully active, able to carry on all pre-disease performance without restriction] : Status 0 - Fully active, able to carry on all pre-disease performance without restriction [MA] : MA [FreeTextEntry2] : Esther

## 2025-07-25 NOTE — DISCUSSION/SUMMARY
[Reviewed Clinical Lab Test(s)] : Results of clinical tests were reviewed. [Reviewed Radiology Report(s)] : Radiology reports were reviewed. [Discuss Alternatives/Risks/Benefits w/Patient] : All alternatives, risks, and benefits were discussed with the patient/family and all questions were answered.  Patient expressed good understanding and appreciates the importance of follow up as recommended. [Visit Time ___ Minutes] : [unfilled] minutes [Face to Face Time___ Minutes] : with [unfilled] minutes in face to face consultation. [FreeTextEntry1] : 53yo P2 w/ AUB- CAH/EIN, and with RASHEEDA / urge incontinence. Pt s/p robo tlh/bso/slnbx, robotic uterosacral ligament suspension on 12/12/24. Surgical course c/b anaphylactic reaction to fluorocine administered IV during urogyn portion of case. Pt made full recovery after ICU admission. Final path benign. Pt had delayed cuff dehiscence and is s/p surgical mgmt w/ LSC SRINIVAS and vaginal cuff repair 6/2/25. Exam today fully healed. -more than 50% of visit spent face to face with patient counseling and coordinating care -I reviewed exam and well healed. prolene sutures still in place and will plan to remove at next visit if have not fallen out on their own -pt can resume all baseline activity gradually -ua/ucx today for test of cure -rtc 3 months for exam and likely dc to general GYN -pt to f/u with PCP as scheduled for health maintenance -pain/fever/bleeding precautions given

## 2025-07-25 NOTE — HISTORY OF PRESENT ILLNESS
[FreeTextEntry1] : 53yo P2 w/ AUB- CAH/EIN, and with RASHEEDA / urge incontinence. Pt s/p robo tlh/bso/slnbx, robotic uterosacral ligament suspension on 24. Final path benign. Surgical course c/b anaphylactic reaction to fluorocine administered IV during urogyn portion of case. Pt made full recovery after ICU admission.  Since last visit pt is now s/p EUA/LSC lysis of adhesions, vaginal cuff repair 25 for new vaginal cuff dehiscence with adherent small bowel. Findings in the OR revealed vaginal cuff dehiscence with small bowel scared to cuff opening. Intra-op gen surg consult (Dr. Gill) called and evaluated small bowel and advised no need for bowel resection.  Pt here today for rpt cuff check.  denies pain/fever/bleeding/bloating. She has normal activity tolerance and normal appetite. Reports normal urination and BMs. She continues pelvic rest. She completed recent abx for UTI, states sxs better but requests test of cure today. We reviewed ct scan done at last visit and no evidence of SBO and pt reports eating is back to normal and no more abdominal pain/bloating/nausea.  Path 24: FINAL PATHOLOGIC DIAGNOSIS A. ANTERIOR ABDOMINAL WALL IMPLANT, EXCISION - Fibroadipose tissue without significant pathologic abnormality - A stain for cytokeratin AE1/3 supports this diagnosis - Deeper levels were examined B.  LEFT PELVIC SENTINEL LYMPH NODE, EXCISION - One histologically unremarkable lymph node - A stain for cytokeratin AE1/3 supports this diagnosis C.  RIGHT PELVIC SENTINEL LYMPH NODE, EXCISION - One histologically unremarkable lymph node - A stain for cytokeratin AE1/3 supports this diagnosis D.  UTERUS, CERVIX, BILATERAL FALLOPIAN TUBES AND BILATERAL OVARIES, HYSTERECTOMY AND BILATERAL SALPINGO-OOPHORECTOMY - Ectocervix with no significant pathologic abnormality - Endocervix with no significant pathologic abnormality - Uterus with benign proliferative endometrium with focal glandular crowding and focal metaplastic epithelial changes - Leiomyomata - Bilateral fimbriated fallopian tubes with no significant pathologic abnormalities - Bilateral ovaries with no significant pathologic abnormalities - Stains for p16, p53, cytokeratin 7, pax8, and CDX2, cytokeratin AE1/3, and Ki67 support these diagnoses - Deeper levels of select blocks [facility] Pathology: A.  Endometrial polyp and endometrial curettings: Atypical hyperplasia with tubal metaplasia involving polypoid and nonpolypoid endometrial fragments in a background of proliferative endometrium.  Unremarkable squamous epithelium.  Unremarkable endocervical tissue, see comment. Comment: Immunohistochemistry shows the atypical glands have loss of PAX2 and PTEN, supporting the above diagnosis.  E-cadherin shows normal membranous staining in ARID1A is retained.  22: Pap: NILM, HPV neg   Imagin25 CT: FINDINGS: LOWER CHEST: Bilateral breast implants. LIVER: Subcentimeter left hepatic hypodensity, too small to further characterize. BILE DUCTS: Normal caliber. GALLBLADDER: Within normal limits. SPLEEN: Within normal limits. PANCREAS: Within normal limits. ADRENALS: Bilateral thickening. KIDNEYS/URETERS: Within normal limits. BLADDER: Within normal limits. REPRODUCTIVE ORGANS: Hysterectomy. BOWEL: No bowel obstruction. Appendix is within normal limits. PERITONEUM/RETROPERITONEUM: Within normal limits. VESSELS: Within normal limits. LYMPH NODES: No lymphadenopathy.   ABDOMINAL WALL: Postsurgical changes. BONES: Mild degenerative changes. IMPRESSION:  Etiology of abdominal pain is not elucidated. --- End of Report ---  MRI pelvis 2024: Findings: The uterus is anteverted and measures 8.5 x 5.8 x 6.6 cm.  The endometrium is mildly heterogeneous in signal, no discrete endometrial mass is evident.  Endometrium measures up to 1.1 cm in the uterine body.  Nabothian cysts are noted in the cervix. The junctional zone is not thickened.  The junctional zone anteriorly measures 0.8 cm and posteriorly 0.9 cm. An intramural leiomyoma in the right aspect of the uterine body measures 0.6 cm.  No additionally leiomyoma. A simple cyst or follicle in the right ovary measures 2.7 x 2.0 cm.  The right ovary measures 3.7 x 2.6 cm. A simple cyst or follicle in the left ovary measures 2.0 x 1.5 cm.  The left ovary measures 2.6 x 2.2 cm. A small amount of intrapelvic free fluid is likely physiologic. Imaged portions of the small bowel and colon are normal in course and caliber without focal wall thickening.  The urinary bladder is incompletely distended.  No bone marrow signal abnormality.   Impression: 1.  Mildly heterogeneous endometrium. 2.  No evidence of discrete endometrial mass, adenomyosis, or submucosal leiomyoma. 3.  Simple bilateral ovarian follicle/cyst, almost certainly benign   Pelvic sonogram 2024: Findings: Uterus: The uterus is anteverted, measuring 8.7 x 5.1 x 5.0 cm.  No discrete fibroids. Endometrium: Mildly prominent, 0.8 cm maximum thickness Right ovary: Measures 2.3 x 1.4 x 1.9 cm.  Right ovary has normal morphology Left ovary: Measures 1.2 x 2.1 x 1.5 cm.  No adnexal mass Free fluid: None Impression: 1.  No adnexal mass or free fluid. 2.  Mild prominence of the endometrium which may be related to the phase of the patient's cycle.  Consider follow-up 1-3 days after cessation of scheduled menses.